# Patient Record
Sex: FEMALE | Race: WHITE | NOT HISPANIC OR LATINO | URBAN - METROPOLITAN AREA
[De-identification: names, ages, dates, MRNs, and addresses within clinical notes are randomized per-mention and may not be internally consistent; named-entity substitution may affect disease eponyms.]

---

## 2020-01-14 ENCOUNTER — IMPORTED ENCOUNTER (OUTPATIENT)
Dept: URBAN - METROPOLITAN AREA CLINIC 50 | Facility: CLINIC | Age: 60
End: 2020-01-14

## 2020-02-07 ENCOUNTER — IMPORTED ENCOUNTER (OUTPATIENT)
Dept: URBAN - METROPOLITAN AREA CLINIC 50 | Facility: CLINIC | Age: 60
End: 2020-02-07

## 2020-02-07 NOTE — PATIENT DISCUSSION
"""Discussed recommend she continue artificial tears. Discussed Restasis.  She has tried it in the ""

## 2020-02-07 NOTE — PATIENT DISCUSSION
"""Discussed No Plaquenil Toxicity. Ok to continue Plaquenil and keep follow up appointments.  Will ""

## 2020-02-11 ENCOUNTER — IMPORTED ENCOUNTER (OUTPATIENT)
Dept: URBAN - METROPOLITAN AREA CLINIC 50 | Facility: CLINIC | Age: 60
End: 2020-02-11

## 2020-02-26 ENCOUNTER — IMPORTED ENCOUNTER (OUTPATIENT)
Dept: URBAN - METROPOLITAN AREA CLINIC 50 | Facility: CLINIC | Age: 60
End: 2020-02-26

## 2020-03-30 ENCOUNTER — IMPORTED ENCOUNTER (OUTPATIENT)
Dept: URBAN - METROPOLITAN AREA CLINIC 50 | Facility: CLINIC | Age: 60
End: 2020-03-30

## 2020-08-20 ENCOUNTER — IMPORTED ENCOUNTER (OUTPATIENT)
Dept: URBAN - METROPOLITAN AREA CLINIC 50 | Facility: CLINIC | Age: 60
End: 2020-08-20

## 2020-08-20 NOTE — PATIENT DISCUSSION
"""Discussed dry eye diagnosis with patient.  Educated patient on proper lid hygiene and stressed importance of lid massages and the use of warm compresses and artificial tears."" ""Start Artificial tears both eyes TID-QID ."""

## 2020-08-20 NOTE — PATIENT DISCUSSION
"""Discussed with patient no retinal toxcity .  Patients states no longer taking Plaquenil since March ""

## 2021-04-17 ASSESSMENT — TONOMETRY
OD_IOP_MMHG: 09
OS_IOP_MMHG: 09
OS_IOP_MMHG: 11
OD_IOP_MMHG: 10

## 2021-04-17 ASSESSMENT — VISUAL ACUITY
OD_PH: 20/25
OS_CC: J1+
OS_SC: 20/30-
OD_SC: 20/25
OD_SC: 20/30-
OS_PH: 20/25
OS_CC: 20/20
OS_CC: J5
OD_CC: 20/30
OD_CC: J5
OS_SC: 20/20-1
OS_SC: 20/20
OD_CC: J1+
OD_SC: 20/30

## 2021-08-16 ENCOUNTER — PREPPED CHART (OUTPATIENT)
Dept: URBAN - METROPOLITAN AREA CLINIC 49 | Facility: CLINIC | Age: 61
End: 2021-08-16

## 2021-08-16 NOTE — PATIENT DISCUSSION
"""Discussed dry eye diagnosis with patient.  Educated patient on proper lid hygiene and stressed importance of lid massages and the use of warm compresses and artificial tears."" ""Start Artificial tears both eyes TID-QID .""."

## 2021-12-13 ENCOUNTER — COMPREHENSIVE EXAM (OUTPATIENT)
Dept: URBAN - METROPOLITAN AREA CLINIC 52 | Facility: CLINIC | Age: 61
End: 2021-12-13

## 2021-12-13 DIAGNOSIS — H26.493: ICD-10-CM

## 2021-12-13 DIAGNOSIS — Z79.899: ICD-10-CM

## 2021-12-13 DIAGNOSIS — H04.123: ICD-10-CM

## 2021-12-13 PROCEDURE — 92015 DETERMINE REFRACTIVE STATE: CPT

## 2021-12-13 PROCEDURE — 92012 INTRM OPH EXAM EST PATIENT: CPT

## 2021-12-13 ASSESSMENT — TONOMETRY
OD_IOP_MMHG: 12
OS_IOP_MMHG: 12

## 2021-12-13 ASSESSMENT — VISUAL ACUITY
OU_CC: J1+ @16"
OD_GLARE: 20/30
OS_SC: 20/30-2
OS_PH: 20/25-1
OD_SC: 20/30-2
OS_GLARE: 20/25
OD_GLARE: 20/40
OD_PH: 20/25-2
OS_GLARE: 20/30

## 2021-12-13 NOTE — PATIENT DISCUSSION
Discussed with patient that she has 2 significant dry spots on the left eye.  It is recommended patient increase her artificial tears to 4 times a day.